# Patient Record
Sex: MALE | Race: BLACK OR AFRICAN AMERICAN | Employment: OTHER | URBAN - METROPOLITAN AREA
[De-identification: names, ages, dates, MRNs, and addresses within clinical notes are randomized per-mention and may not be internally consistent; named-entity substitution may affect disease eponyms.]

---

## 2017-03-16 ENCOUNTER — TELEPHONE (OUTPATIENT)
Dept: SLEEP MEDICINE | Age: 77
End: 2017-03-16

## 2017-03-16 ENCOUNTER — HOSPITAL ENCOUNTER (OUTPATIENT)
Dept: SLEEP MEDICINE | Age: 77
Discharge: HOME | End: 2017-03-16
Payer: MEDICARE

## 2017-03-16 DIAGNOSIS — I10 BENIGN ESSENTIAL HYPERTENSION: ICD-10-CM

## 2017-03-16 DIAGNOSIS — E66.09 EXOGENOUS OBESITY: ICD-10-CM

## 2017-03-16 DIAGNOSIS — G47.33 OSA (OBSTRUCTIVE SLEEP APNEA): ICD-10-CM

## 2017-03-16 PROCEDURE — 95811 POLYSOM 6/>YRS CPAP 4/> PARM: CPT

## 2017-03-17 ENCOUNTER — DOCUMENTATION ONLY (OUTPATIENT)
Dept: SLEEP MEDICINE | Age: 77
End: 2017-03-17

## 2017-03-17 NOTE — PROGRESS NOTES
Sleep study scored per the American Academy of Sleep Medicine (AASM) Manual for the Scoring of Sleep and Associated Events, Rules, Terminology and Technical Specifications Visual and Cardiac Rules, 2007. V2.2    Preliminary split sleep study report scanned in to patient's chart.

## 2017-03-17 NOTE — PROGRESS NOTES
· Patient arrived for sleep study. · Physician orders were reviewed. · Patient education to include initiation of PAP therapy per protocol. · Patient questions were answered. · The patient demonstrated good understanding of the positive airway pressure device.     [facility]

## 2017-03-17 NOTE — PROGRESS NOTES
 Sleep study completed per physician order.  Patient discharged from sleep center.  Patient awake, alert and able to leave the facility under their own power.  Instructed to follow up with their sleep physician for results.    [facility]

## 2017-10-31 ENCOUNTER — HOSPITAL ENCOUNTER (OUTPATIENT)
Dept: PHYSICAL THERAPY | Age: 77
Discharge: HOME | End: 2017-10-31
Payer: MEDICARE

## 2017-10-31 PROCEDURE — G8979 MOBILITY GOAL STATUS: HCPCS

## 2017-10-31 PROCEDURE — 97110 THERAPEUTIC EXERCISES: CPT

## 2017-10-31 PROCEDURE — G8978 MOBILITY CURRENT STATUS: HCPCS

## 2017-10-31 PROCEDURE — 97162 PT EVAL MOD COMPLEX 30 MIN: CPT

## 2017-10-31 PROCEDURE — 97112 NEUROMUSCULAR REEDUCATION: CPT

## 2017-10-31 NOTE — PROGRESS NOTES
PHYSICAL THERAPY - DAILY TREATMENT NOTE    Patient Name: Yolie Montoya        Date: 10/31/2017  : 1940   YES Patient  Verified  Visit #:   1     Insurance: Payor: [place] MEDICARE / Plan: VA MEDICARE PART A & B / Product Type: Medicare /      In time: 1:21 pm Out time: 2:31pm   Total Treatment Time: 70     Medicare Time Tracking (below)   Total Timed Codes (min):  33 1:1 Treatment Time:  33     TREATMENT AREA =  Vertigo [R42]  SUBJECTIVE  Pain Level (on 0 to 10 scale):  4  / 10   Medication Changes/New allergies or changes in medical history, any new surgeries or procedures? NO    If yes, update Summary List   Subjective Functional Status/Changes:  []  No changes reported     See POC         OBJECTIVE  Modalities Rationale:   N/a    8 min Neuro Re-education: Education in vestibular rehabilitation program, anatomy and physiology, VSE in sitting and purpose of vestibular adaptation exercise   Rationale:        25 min Therapeutic Exercise:  [x]  See flow sheet   Rationale:      increase ROM and increase strength to improve the patients ability to perform ADLs      min Patient Education:  YES  Reviewed HEP   []  Progressed/Changed HEP based on: Other Objective/Functional Measures:    Other Tests:Justification for Eval Complexity:   Patient History: HIGH Complexity :3+ comorbidities / personal factors will impact the outcome/ POC  (Arthritis, Hearing impairment, HTN, asthma, hx stroke)  Examination:HIGH Complexity : 4+ Standardized tests and measures addressing body structure, function, activity limitation and / or participation in recreation  (See POC and musculoskeletal examination attached,  DGI,+DYAN Cm)  Clinical Presentation: MEDIUM Complexity : Evolving with changing characteristics  (pain level 4/10 on average and 8/10 @ worst, dizziness and imbalance, constant pain)  Clinical Decision Making:MEDIUM Complexity : FOTO score of 26-74 (Foto 65/100) (DHI 60/100)  Overall Complexity:MEDIUM     Post Treatment Pain Level (on 0 to 10) scale:   4  / 10     ASSESSMENT  Assessment/Changes in Function:     See POC   []  See Progress Note/Recertification   Patient will continue to benefit from skilled PT services to modify and progress therapeutic interventions, address functional mobility deficits, address ROM deficits, address strength deficits, analyze and address soft tissue restrictions, analyze and cue movement patterns, analyze and modify body mechanics/ergonomics and assess and modify postural abnormalities to attain remaining goals.    Progress toward goals / Updated goals:    See POC     PLAN  [x]  Upgrade activities as tolerated YES Continue plan of care   []  Discharge due to :    []  Other:      Therapist: Rosemary Quintana DPT     Date: 10/31/2017 Time: 7:53 AM     Future Appointments  Date Time Provider Cyndi Mckeon   10/31/2017 1:30 PM [address]   11/16/2017 11:00 AM Dedrick Navarro MD [address]

## 2017-10-31 NOTE — PROGRESS NOTES
[facility] AT [facility] [address], [address], [address] - Phone: [phone]  Fax: [phone] / [address] [facility]  Patient Name: Stefany Walker : 1940   Medical   Diagnosis: Vertigo [R42] Treatment Diagnosis: Vertigo    Onset Date: 2017     Referral Source: Arleen Cooper MD [place] [facility]): 10/31/2017   Prior Hospitalization: See medical history Provider #: [id number]   Prior Level of Function: Imbalance without sx    Comorbidities: Arthritis, Hearing Impairment, HTN, Asthma, Hx of  Stroke (2000), Osteoporosis, Hypercholesterolemia, Sleep Apnea, Neck Surgery (2008), Prostate surgery (2017)   Medications: Verified on Patient Summary List   The Plan of Care and following information is based on the information from the initial evaluation.    ===========================================================================================  Assessment / key information:  Patient is a 69 y.o. male who presents to [facility] with diagnosis of Vertigo [R42]. Patient reports nausea, spinning, vomiting, and imbalance began with laying on the right side in bed on 2017. MRI and CT scan was clear. Reports hx of falls and notes frequent veering with ambulation. Dizziness increases with looking down, getting in and out of bed, and forward bending. Imbalance increases with walking in the dark, walking in the grocery store, and walking on uneven surfaces. Upon objective evaluation, patient demonstrates impaired use of vestibular input and ankle/hip balance strategies. Patient scored 17/24 on DGI and 60/100 on [address] indicating increased risk of fall with ambulation. R/L Lower extremity muscle strength was as follows: hip flexion 4/4+, hip ABD 3/3+, hip ADD 4/4-, hip ext 3+/3+, knee flexion 5-/5-, knee extension 4+/4+, and DF 4/4+.   Patient demonstrated (+) Hallpike alberto on the right. Patient scored 72 on FOTO indcating decreased quality of life. Patient can benefit from PT interventions to decrease r/o fall, improve safety with ADLs, & decrease sx with ADLs & to improve overall functional status.  ==================================================================================  Eval Complexity: History: HIGH Complexity :3+ comorbidities / personal factors will impact the outcome/ POC Exam:HIGH Complexity : 4+ Standardized tests and measures addressing body structure, function, activity limitation and / or participation in recreation  Presentation: MEDIUM Complexity : Evolving with changing characteristics  Clinical Decision Making:MEDIUM Complexity : FOTO score of 26-74Overall Complexity:MEDIUM  Problem List: decrease strength, impaired gait/ balance, decrease ADL/ functional abilitiies, decrease activity tolerance, decrease flexibility/ joint mobility and decrease transfer abilities  Treatment Plan may include any combination of the following: Therapeutic exercise, Therapeutic activities, Neuromuscular re-education, Physical agent/modality, Gait/balance training, Manual therapy and Patient education  Patient / Family readiness to learn indicated by: asking questions, trying to perform skills and interest  Persons(s) to be included in education: patient (P)  Barriers to Learning/Limitations: yes;  sensory deficits-vision/hearing/speech  Measures taken: Visual cuing and visual aids   Patient Goal (s): \"less dizziness\"   Patient self reported health status: fair  Rehabilitation Potential: good   Short Term Goals: To be accomplished in  4  Weeks:  1) Patient performing daily HEP and educated in fall prevention techniques. 2) Patient will be able to maintain MSR heel to GT for 30 seconds eyes open to increase safety with standing in narrow spaces. 3) Patient will be able to maintain MSR heel to arch for 30 seconds eyes closed to increase safety with showering.      4) Patient to improve score on DGI to 19/24 indicating decreased fall risk with community ambulation.  Long Term Goals: To be accomplished in  8  Weeks:  1)  Patient to be independent  with HEP. 2) Patient to improve score on DGI to 21/24 indicating decreased fall risk with ambulation. 3) Patient to improve score on FOTO to 78 indicating improved quality of life. 4)  Patient to improve score on DHI to 45/100 indicating decreased fall risk with ambulation. 5) Patient will be able to maintain MSR heel to bunion for 30 seconds eyes closed to increase safety with showering. Frequency / Duration:   Patient to be seen  1-2  times per week for 6-8  weeks:  Patient / Caregiver education and instruction: self care, activity modification and exercises  G-Codes (GP): Mobility [id number] Current  CK= 40-59%   Goal  CJ= 20-39%. The severity rating is based on the Other DGI      Therapist Signature: Harshil Gustafson Date: 36/26/4953   Certification Period: 10/31/2017 to 1/30/2017 Time: 7:55 AM   ===========================================================================================  I certify that the above Physical Therapy Services are being furnished while the patient is under my care. I agree with the treatment plan and certify that this therapy is necessary. Physician Signature:        Date:       Time:     Please sign and return to [facility] or you may fax the signed copy to [phone]. Thank you.

## 2017-11-06 ENCOUNTER — HOSPITAL ENCOUNTER (OUTPATIENT)
Dept: PHYSICAL THERAPY | Age: 77
Discharge: HOME | End: 2017-11-06
Payer: MEDICARE

## 2017-11-06 PROCEDURE — 97112 NEUROMUSCULAR REEDUCATION: CPT

## 2017-11-06 PROCEDURE — 97116 GAIT TRAINING THERAPY: CPT

## 2017-11-06 NOTE — PROGRESS NOTES
PHYSICAL THERAPY - DAILY TREATMENT NOTE    Patient Name: Trevor Monzon        Date: 2017  : 1940   YES Patient  Verified  Visit #:   2     Insurance: Payor: VA MEDICARE / Plan: VA MEDICARE PART A & B / Product Type: Medicare /      In time: 11:41am Out time: 12:10am   Total Treatment Time: 29     Medicare Time Tracking (below)   Total Timed Codes (min):  29 1:1 Treatment Time:  29     TREATMENT AREA =  Vertigo [R42]    SUBJECTIVE  Pain Level (on 0 to 10 scale):  0  / 10   Medication Changes/New allergies or changes in medical history, any new surgeries or procedures? NO    If yes, update Summary List   Subjective Functional Status/Changes:  []  No changes reported     Patient States \"I have an appointment Wednesday, I did get a little dizzy, but the dizziness wasn't as frequent as Friday, I moved too fast just stepping out of the chair going to the closet and almost tripped over wife's chair\"         OBJECTIVE  Modalities Rationale:  n/a    19 min Neuromuscular Re-ed: Static and dynamic balance as per flow sheet   Rationale:    improve balance to improve the patients ability to ambulate on uneven terrain and in narrow corridors. 10 min Gait Training: See flow Sheet   Rationale:         min Patient Education:  YES  Reviewed HEP   []  Progressed/Changed HEP based on: Other Objective/Functional Measures: Added MRS heel to bun EO, heel to arch EC     Post Treatment Pain Level (on 0 to 10) scale:   0  / 10     ASSESSMENT  Assessment/Changes in Function:     Smith pike alberto and maneuver deferred to next visit secondary to vestibular testing with MD Wednesday. Increased difficulty and sway with balance with EC indicating vestibular dysfunction.  Increased veer right with ambulation with EC.      []  See Progress Note/Recertification   Patient will continue to benefit from skilled PT services to modify and progress therapeutic interventions, address functional mobility deficits, address ROM deficits, address strength deficits, analyze and address soft tissue restrictions, analyze and cue movement patterns, analyze and modify body mechanics/ergonomics, assess and modify postural abnormalities, address imbalance/dizziness and instruct in home and community integration to attain remaining goals. Progress toward goals / Updated goals:  First visit after initial evaluation. Progress tx per POC.         PLAN  [x]  Upgrade activities as tolerated YES Continue plan of care   []  Discharge due to :    []  Other:      Therapist: Kortney Nguyen    Date: 11/6/2017 Time: 8:30 AM     Future Appointments  Date Time Provider Cyndi Mckeon   11/6/2017 12:00 PM [place] [address]   11/15/2017 12:00 PM [place] [address]   11/16/2017 11:00 AM Kamila Redman MD [address] [facility]   11/21/2017 1:30 PM [employer] [address] [place] [address] [address]   11/28/2017 1:30 PM Kortney Nguyen [place] [address]   12/4/2017 2:30 PM Kortney MATSON [facility] [address]

## 2017-11-15 ENCOUNTER — HOSPITAL ENCOUNTER (OUTPATIENT)
Dept: PHYSICAL THERAPY | Age: 77
Discharge: HOME | End: 2017-11-15
Payer: MEDICARE

## 2017-11-15 PROCEDURE — 97112 NEUROMUSCULAR REEDUCATION: CPT

## 2017-11-15 PROCEDURE — 97140 MANUAL THERAPY 1/> REGIONS: CPT

## 2017-11-15 NOTE — PROGRESS NOTES
PHYSICAL THERAPY - DAILY TREATMENT NOTE    Patient Name: Camila Vargas        Date: 11/15/2017  : 1940   YES Patient  Verified  Visit #:   3     Insurance: Payor: VA MEDICARE / Plan: VA MEDICARE PART A & B / Product Type: Medicare /      In time: 12:01pm Out time: 12:26   Total Treatment Time: 25     Medicare Time Tracking (below)   Total Timed Codes (min):  25 1:1 Treatment Time:  25     TREATMENT AREA =  Vertigo [R42]    SUBJECTIVE  Pain Level (on 0 to 10 scale):  0  / 10   Medication Changes/New allergies or changes in medical history, any new surgeries or procedures? NO    If yes, update Summary List   Subjective Functional Status/Changes:  []  No changes reported     Patient States \"been moving real slow for the past 2 days. With the testing the only dizziness I got was standing up walking, if I lay down I dont get dizzy, Ihave to be careful when I'm rolling to the right. Linda tried to roll on the right no spinning or anything\"       OBJECTIVE  Modalities Rationale:  n/a    15 min Neuromuscular Re-ed: Post maneuver instructions   Rationale:    improve balance to improve the patients ability to ambulate on uneven terrain and in narrow corridors. 10 min Manual Therapy: Kaylee Marr, maneuver for R    Rationale:         min Patient Education:  YES  Reviewed HEP   []  Progressed/Changed HEP based on: Other Objective/Functional Measures:    R Hallpike Alberto (+)/ L Hallpike (-)   Post Treatment Pain Level (on 0 to 10) scale:   0  / 10     ASSESSMENT  Assessment/Changes in Function:     Demonstrated nystagmus with R hallpike alberto. Educated patient in post maneuver instruction, issued instructions with good patient understanding. Reassess Hallpike next visit.       []  See Progress Note/Recertification   Patient will continue to benefit from skilled PT services to modify and progress therapeutic interventions, address functional mobility deficits, address ROM deficits, address strength deficits, analyze and address soft tissue restrictions, analyze and cue movement patterns, analyze and modify body mechanics/ergonomics, assess and modify postural abnormalities, address imbalance/dizziness and instruct in home and community integration to attain remaining goals. Progress toward goals / Updated goals:    Progressing towards STG 1.   1) Patient performing daily HEP and educated in fall prevention techniques.  Goal Met Linda been doing the balance exercises 2x per day       PLAN  [x]  Upgrade activities as tolerated YES Continue plan of care   []  Discharge due to :    []  Other:      Therapist: Ioana Maria    Date: 11/15/2017 Time: 12:02 PM     Future Appointments  Date Time Provider Cyndi Mckeon   11/21/2017 1:30 PM [address]   11/29/2017 12:00 PM [place]   12/4/2017 2:30 PM [place] [address]   12/12/2017 1:45 PM Yunier De La Cruz MD [address] [facility]

## 2017-11-21 ENCOUNTER — HOSPITAL ENCOUNTER (OUTPATIENT)
Dept: PHYSICAL THERAPY | Age: 77
Discharge: HOME | End: 2017-11-21
Payer: MEDICARE

## 2017-11-21 PROCEDURE — 97140 MANUAL THERAPY 1/> REGIONS: CPT

## 2017-11-21 NOTE — PROGRESS NOTES
PHYSICAL THERAPY - DAILY TREATMENT NOTE    Patient Name: Amor Pemberton        Date: 2017  : 1940   YES Patient  Verified  Visit #:   4   of   -  Insurance: Payor: VA MEDICARE / Plan: VA MEDICARE PART A & B / Product Type: Medicare /      In time: 1:30pm Out time: 1:45pm   Total Treatment Time: 15     Medicare Time Tracking (below)   Total Timed Codes (min):  15 1:1 Treatment Time:  15     TREATMENT AREA =  Vertigo [R42]    SUBJECTIVE  Pain Level (on 0 to 10 scale):  0  / 10   Medication Changes/New allergies or changes in medical history, any new surgeries or procedures? NO    If yes, update Summary List   Subjective Functional Status/Changes:  []  No changes reported     Patient States \"the first few days were good and then I started to get dizzy more of a smooth dizzy ness\"         OBJECTIVE  Modalities Rationale:  n/a    15 min Manual Therapy: Demax Sear, maneuver for R    Rationale:         min Patient Education:  YES  Reviewed HEP   []  Progressed/Changed HEP based on: Other Objective/Functional Measures:    R Hallpike Mackenzie (+)/ L Hallpike (-)     Post Treatment Pain Level (on 0 to 10) scale:   0  / 10     ASSESSMENT  Assessment/Changes in Function:     Demonstrated nystagmus with R hallpike mackenzie. Increased dizziness and sx with laying on left side and looking down. Educated patient in post maneuver instruction, issued instructions with good patient understanding. Reassess Hallpike next visit.       []  See Progress Note/Recertification   Patient will continue to benefit from skilled PT services to modify and progress therapeutic interventions, address functional mobility deficits, address ROM deficits, address strength deficits, analyze and address soft tissue restrictions, analyze and cue movement patterns, analyze and modify body mechanics/ergonomics, assess and modify postural abnormalities, address imbalance/dizziness and instruct in home and community integration to attain remaining goals. Progress toward goals / Updated goals:    Progressing towards STG 1.   1) Patient performing daily HEP and educated in fall prevention techniques.       PLAN  [x]  Upgrade activities as tolerated YES Continue plan of care   []  Discharge due to :    []  Other:      Therapist: Vera Coughlin    Date: 11/21/2017 Time: 8:23 AM     Future Appointments  Date Time Provider Cyndi Mckeon   11/21/2017 1:30 PM [address]   11/29/2017 12:00 PM [address]   12/4/2017 2:30 PM [address]   12/12/2017 1:45 PM Alejandro Kim MD [address]

## 2017-11-28 ENCOUNTER — APPOINTMENT (OUTPATIENT)
Dept: PHYSICAL THERAPY | Age: 77
End: 2017-11-28
Payer: MEDICARE

## 2017-11-29 ENCOUNTER — HOSPITAL ENCOUNTER (OUTPATIENT)
Dept: PHYSICAL THERAPY | Age: 77
Discharge: HOME | End: 2017-11-29
Payer: MEDICARE

## 2017-11-29 PROCEDURE — 97116 GAIT TRAINING THERAPY: CPT

## 2017-11-29 PROCEDURE — 97112 NEUROMUSCULAR REEDUCATION: CPT

## 2017-11-29 NOTE — PROGRESS NOTES
PHYSICAL THERAPY - DAILY TREATMENT NOTE    Patient Name: Sydney Hernandez        Date: 2017  : 1940   YES Patient  Verified  Visit #:      of   -  Insurance: Payor: VA MEDICARE / Plan: VA MEDICARE PART A & B / Product Type: Medicare /      In time: 12:03pm Out time: 12:40pm   Total Treatment Time: 37     Medicare Time Tracking (below)   Total Timed Codes (min):  37 1:1 Treatment Time:  37     TREATMENT AREA =  Vertigo [R42]    SUBJECTIVE  Pain Level (on 0 to 10 scale):  0  / 10   Medication Changes/New allergies or changes in medical history, any new surgeries or procedures? NO    If yes, update Summary List   Subjective Functional Status/Changes:  []  No changes reported     Patient States \"i feel 95% better I can roll on my right side without dizziness\"         OBJECTIVE  Modalities Rationale:  n/a    29 min Neuromuscular Re-ed: Static and dynamic balance as per flow sheet   Rationale:    improve balance to improve the patients ability to ambulate on uneven terrain and in narrow corridors. 8 min Gait Training: See flow Sheet, DGI    Rationale:         min Patient Education:  Brandi Del Cid   []  Progressed/Changed HEP based on: Other Objective/Functional Measures:    See PN     Post Treatment Pain Level (on 0 to 10) scale:   0  / 10     ASSESSMENT  Assessment/Changes in Function:     See PC     []  See Progress Note/Recertification   Patient will continue to benefit from skilled PT services to modify and progress therapeutic interventions, address functional mobility deficits, address ROM deficits, address strength deficits, analyze and address soft tissue restrictions, analyze and cue movement patterns, analyze and modify body mechanics/ergonomics, assess and modify postural abnormalities, address imbalance/dizziness and instruct in home and community integration to attain remaining goals.    Progress toward goals / Updated goals:    See PN     PLAN  [x]  Upgrade activities as tolerated YES Continue plan of care   []  Discharge due to :    []  Other:      Therapist: Tian Griffin    Date: 11/29/2017 Time: 8:25 AM     Future Appointments  Date Time Provider Cyndi Mckeon   11/29/2017 12:00 PM [address]   12/4/2017 2:30 PM [address]   12/12/2017 1:45 PM Jeet Zimmer MD [address] [facility]

## 2017-12-04 ENCOUNTER — HOSPITAL ENCOUNTER (OUTPATIENT)
Dept: PHYSICAL THERAPY | Age: 77
Discharge: HOME | End: 2017-12-04
Payer: MEDICARE

## 2017-12-04 PROCEDURE — 97116 GAIT TRAINING THERAPY: CPT

## 2017-12-04 PROCEDURE — 97112 NEUROMUSCULAR REEDUCATION: CPT

## 2017-12-04 NOTE — PROGRESS NOTES
PHYSICAL THERAPY - DAILY TREATMENT NOTE    Patient Name: Nafisa Aguirre        Date: 2017  : 1940   YES Patient  Verified  Visit #:   (6) 1   of   3  Insurance: Payor: VA MEDICARE / Plan: VA MEDICARE PART A & B / Product Type: Medicare /      In time: 2:35 pm Out time: 3:07pm   Total Treatment Time: 32     Medicare Time Tracking (below)   Total Timed Codes (min):  32 1:1 Treatment Time:  32     TREATMENT AREA =  Vertigo [R42]    SUBJECTIVE  Pain Level (on 0 to 10 scale):  0  / 10   Medication Changes/New allergies or changes in medical history, any new surgeries or procedures? NO    If yes, update Summary List   Subjective Functional Status/Changes:  []  No changes reported     Patient States \"no sx rolling in bed to the right, on the  I have an appt with the [facility]\"         OBJECTIVE  Modalities Rationale:  n/a    20 min Neuromuscular Re-ed: Static and dynamic balance as per flow sheet   Rationale:    improve balance to improve the patients ability to ambulate on uneven terrain and in narrow corridors. 12 min Gait Training: See flow Sheet   Rationale:         min Patient Education:  YES  Reviewed HEP   []  Progressed/Changed HEP based on: Other Objective/Functional Measures: Added VVI with good tolerance. Post Treatment Pain Level (on 0 to 10) scale:   0  / 10     ASSESSMENT  Assessment/Changes in Function:     Progressed vestibular adaptation exercises as appropriate, VSE to standing, VVI in sitting. Progressed heel to arch EC to heel to bunion EC with increased difficulty. Increased sway with EC on foam rhomberg. Updated visual adaptation and balance training.       []  See Progress Note/Recertification   Patient will continue to benefit from skilled PT services to modify and progress therapeutic interventions, address functional mobility deficits, address ROM deficits, address strength deficits, analyze and address soft tissue restrictions, analyze and cue movement patterns, analyze and modify body mechanics/ergonomics, assess and modify postural abnormalities, address imbalance/dizziness and instruct in home and community integration to attain remaining goals. Progress toward goals / Updated goals:    Progressing towards all LTGs.       PLAN  [x]  Upgrade activities as tolerated YES Continue plan of care   []  Discharge due to :    []  Other:      Therapist: Sena Garcia    Date: 12/4/2017 Time: 11:33 AM     Future Appointments  Date Time Provider Cyndi Mckeon   12/11/2017 11:30 AM [address]   12/12/2017 1:45 PM Juanito Roach MD [facility]   12/21/2017 3:30 PM [address]

## 2017-12-11 ENCOUNTER — HOSPITAL ENCOUNTER (OUTPATIENT)
Dept: PHYSICAL THERAPY | Age: 77
Discharge: HOME | End: 2017-12-11
Payer: MEDICARE

## 2017-12-11 PROCEDURE — 97112 NEUROMUSCULAR REEDUCATION: CPT

## 2017-12-11 PROCEDURE — 97116 GAIT TRAINING THERAPY: CPT

## 2017-12-11 NOTE — PROGRESS NOTES
PHYSICAL THERAPY - DAILY TREATMENT NOTE    Patient Name: Nura Mosley        Date: 2017  : 1940   YES Patient  Verified  Visit #:   (7) 2   of   3  Insurance: Payor: [place] / Plan: [place] MEDICARE PART A & B / Product Type: Medicare /      In time: 11:30am Out time: 12:01   Total Treatment Time: 31     Medicare Time Tracking (below)   Total Timed Codes (min):  31 1:1 Treatment Time:  31     TREATMENT AREA =  Vertigo [R42]    SUBJECTIVE  Pain Level (on 0 to 10 scale):  0  / 10   Medication Changes/New allergies or changes in medical history, any new surgeries or procedures? NO    If yes, update Summary List   Subjective Functional Status/Changes:  []  No changes reported     Patient States \"balance is going as usual before I had the vertigo, I do get a little light dizziness. If I get in bed too fast and roll to the right I get a little dizziness\"       OBJECTIVE  Modalities Rationale:  n/a    20 min Neuromuscular Re-ed: Static and dynamic balance as per flow sheet   Rationale:    improve balance to improve the patients ability to ambulate on uneven terrain and in narrow corridors. 11 min Gait Training: See flow Sheet   Rationale:         min Patient Education:  YES  Reviewed HEP   []  Progressed/Changed HEP based on: Other Objective/Functional Measures:    Slight veering with HHT and VHT during ambulation. Slight veer L and decreased rock with bkw amb. Progressed VSE and VVI to standing with background feet apart     Post Treatment Pain Level (on 0 to 10) scale:   0  / 10     ASSESSMENT  Assessment/Changes in Function:     Patient has progressed well with PT services consistently reporting improved sx and functional mobility, thus patient is appropriate for D/C to home mgt of sx next visit. Discussed discharge planning, with patient reported agreeance.       []  See Progress Note/Recertification   Patient will continue to benefit from skilled PT services to modify and progress therapeutic interventions, address functional mobility deficits, address ROM deficits, address strength deficits, analyze and address soft tissue restrictions, analyze and cue movement patterns, analyze and modify body mechanics/ergonomics, assess and modify postural abnormalities, address imbalance/dizziness and instruct in home and community integration to attain remaining goals. Progress toward goals / Updated goals:    Progressing towards all LTGs.       PLAN  [x]  Upgrade activities as tolerated YES Continue plan of care   []  Discharge due to :    []  Other:      Therapist: Richard Jones    Date: 12/11/2017 Time: 11:31 AM     Future Appointments  Date Time Provider Cyndi Mckeon   12/12/2017 1:45 PM Kerri Braswell MD [id number] [facility]   12/21/2017 3:30 PM [address]

## 2017-12-21 ENCOUNTER — HOSPITAL ENCOUNTER (OUTPATIENT)
Dept: PHYSICAL THERAPY | Age: 77
Discharge: HOME | End: 2017-12-21
Payer: MEDICARE

## 2017-12-21 PROCEDURE — G8979 MOBILITY GOAL STATUS: HCPCS

## 2017-12-21 PROCEDURE — 97116 GAIT TRAINING THERAPY: CPT

## 2017-12-21 PROCEDURE — 97112 NEUROMUSCULAR REEDUCATION: CPT

## 2017-12-21 PROCEDURE — G8980 MOBILITY D/C STATUS: HCPCS

## 2017-12-21 NOTE — PROGRESS NOTES
PHYSICAL THERAPY - DAILY TREATMENT NOTE    Patient Name: Oscar Oconnell        Date: 2017  : 1940   YES Patient  Verified  Visit #:   (8) 3   of   3  Insurance: Payor: [place] MED[place]ARE / Plan: [place] MEDICARE PART A & B / Product Type: Medicare /      In time: 3:34 pm Out time: 4:14pm   Total Treatment Time: 40     Medicare Time Tracking (below)   Total Timed Codes (min):  40 1:1 Treatment Time:  40     TREATMENT AREA =  Vertigo [R42]    SUBJECTIVE  Pain Level (on 0 to 10 scale):  0  / 10   Medication Changes/New allergies or changes in medical history, any new surgeries or procedures? NO    If yes, update Summary List   Subjective Functional Status/Changes:  []  No changes reported     Pt states \"balance is good and still get dizziness, had an ENT appt, and she put me back in the chair 3-4 times, went in a room machine\"         OBJECTIVE  Modalities Rationale:   N/A     30 min Neuromuscular Re-ed: Static and dynamic balance as per flow sheet   Rationale:    improve balance to improve the patients ability to ambulate on uneven terrain and in narrow corridors.      10 min Gait Training: See flow Sheet   Rationale:         min Patient Education:  YES  Reviewed HEP   []  Progressed/Changed HEP based on: Other Objective/Functional Measures:    FOTO =70/100     Post Treatment Pain Level (on 0 to 10) scale:   0  / 10     ASSESSMENT  Assessment/Changes in Function:     See DC.     []  See Progress Note/Recertification   Patient will continue to benefit from skilled PT services to modify and progress therapeutic interventions, address functional mobility deficits, address ROM deficits, address strength deficits, analyze and address soft tissue restrictions, analyze and cue movement patterns, analyze and modify body mechanics/ergonomics, assess and modify postural abnormalities, address imbalance/dizziness and instruct in home and community integration to attain remaining goals.    Progress toward goals / Updated goals:    See DC     PLAN  []  Upgrade activities as tolerated YES Continue plan of care   [x]  Discharge due to : Met or progressing towards all goals.    []  Other:      Therapist: Dede Mathias    Date: 12/21/2017 Time: 11:42 AM     Future Appointments  Date Time Provider Cyndi Mckeon   12/21/2017 3:30 PM [address]   6/15/2018 2:00 PM Elan Jackson MD [address] [facility]

## 2017-12-21 NOTE — PROGRESS NOTES
[facility] PHYSICAL THERAPY  [address] [facility] [address] - Phone: [phone]  Fax: [phone] SUMMARY FOR PHYSICAL THERAPY          Patient Name: Oscar Oconnell : 1940   Treatment/Medical Diagnosis: Vertigo [R42]   Onset Date: 2017    Referral Source: Indiana Melendez MD [place] of Care [facility]): 10/31/2017   Prior Hospitalization: See Medical History Provider #: [id number]   Prior Level of Function: Imbalance without sx    Comorbidities: Arthritis, Hearing Impairment, HTN, Asthma, Hx of  Stroke (2000), Osteoporosis, Hypercholesterolemia, Sleep Apnea, Neck Surgery (2008), Prostate surgery (2017)   Medications: Verified on Patient Summary List   Visits from [facility]: 8 Missed Visits: 0     Goal/Measure of Progress Goal Met? 1. Patient to improve score on DGI to 19/24 indicating decreased fall risk with community ambulation. Status at last Eval: DGI = 17/24 Current Status: DGI = 18/24 Progressing   2. Patient to improve score on FOTO to 78 indicating improved quality of life. Status at last Eval: FOTO = 65/100 Current Status: FOTO = 70/100 Progressing    3. Patient will be able to maintain MSR heel to bunion for 30 seconds eyes closed to increase safety with    Status at last Eval: MSR heel to arch EC = 30 sec Current Status: L/R MSR heel to Bunion EC = 29/30 sec yes   4. Patient to improve score on DHI to 45/100 indicating decreased fall risk with ambulation.     Status at last Eval: [address] [place] [address] = 60/100 Current Status: [address] [place] [address] = 38/100 yes     Key Functional Changes/Progress: The pt has progressed well with therapy, consistently reporting decreased pain and increased functional ability. Pt reports 80% improvement in balance and dizziness sx since initiating PT services.  Based on progress from PT services and pt reported improvement, patient is appropriate for DC to home mgt of sx at this time    G-Codes (GP): Mobility   Goal  CJ= 20-39%  D/C  CJ= 20-39%. The severity rating is based on the Other DGI    Assessments/Recommendations: Discontinue therapy. Progressing towards or have reached established goals. If you have any questions/comments please contact us directly at  [phone]. Thank you for allowing us to assist in the care of your patient.     Therapist Signature: Shannan Gray Date: 12/21/2017   Reporting Period: 10/31/2017 to 12/21/2017 Time: 11:44 AM

## 2018-09-25 PROBLEM — H81.10 BENIGN PAROXYSMAL POSITIONAL VERTIGO: Status: ACTIVE | Noted: 2017-09-29

## 2018-09-25 PROBLEM — N40.0 PROSTATE HYPERTROPHY: Status: ACTIVE | Noted: 2017-05-16

## 2018-09-25 PROBLEM — R42 VERTIGO: Status: ACTIVE | Noted: 2017-09-29

## 2018-09-25 PROBLEM — N40.0 BPH (BENIGN PROSTATIC HYPERPLASIA): Status: ACTIVE | Noted: 2017-05-16

## 2018-09-25 PROBLEM — R07.9 CHEST PAIN: Status: ACTIVE | Noted: 2017-09-29

## 2022-03-11 PROBLEM — F43.12 CHRONIC POST-TRAUMATIC STRESS DISORDER: Status: ACTIVE | Noted: 2022-03-11

## 2022-03-11 PROBLEM — E78.5 HYPERLIPIDEMIA: Status: ACTIVE | Noted: 2022-03-11

## 2022-03-11 PROBLEM — E66.3 OVERWEIGHT: Status: ACTIVE | Noted: 2022-03-11

## 2022-03-11 PROBLEM — M77.30 CALCANEAL SPUR: Status: ACTIVE | Noted: 2022-03-11

## 2022-03-11 PROBLEM — I10 BENIGN ESSENTIAL HYPERTENSION: Status: ACTIVE | Noted: 2022-03-11

## 2022-03-11 PROBLEM — H52.4 PRESBYOPIA: Status: ACTIVE | Noted: 2022-03-11

## 2022-03-11 PROBLEM — K21.9 GASTROESOPHAGEAL REFLUX DISEASE: Status: ACTIVE | Noted: 2022-03-11

## 2022-03-11 PROBLEM — M25.519 SHOULDER PAIN: Status: ACTIVE | Noted: 2022-03-11

## 2022-03-11 PROBLEM — H91.93 BILATERAL HEARING LOSS: Status: ACTIVE | Noted: 2022-03-11

## 2022-03-11 PROBLEM — N42.9 DISORDER OF PROSTATE: Status: ACTIVE | Noted: 2022-03-11

## 2022-03-11 PROBLEM — I63.9 CEREBROVASCULAR ACCIDENT (HCC): Status: ACTIVE | Noted: 2022-03-11

## 2022-03-11 PROBLEM — R32 URINARY INCONTINENCE: Status: ACTIVE | Noted: 2022-03-11

## 2022-03-11 PROBLEM — M54.50 LOW BACK PAIN: Status: ACTIVE | Noted: 2022-03-11

## 2022-03-11 PROBLEM — M54.2 NECK PAIN: Status: ACTIVE | Noted: 2022-03-11

## 2022-03-11 PROBLEM — H52.209 ASTIGMATISM: Status: ACTIVE | Noted: 2022-03-11

## 2022-03-11 PROBLEM — M10.9 GOUT: Status: ACTIVE | Noted: 2022-03-11

## 2022-03-11 PROBLEM — S82.899A CLOSED FRACTURE OF ANKLE: Status: ACTIVE | Noted: 2022-03-11

## 2022-03-11 PROBLEM — I63.30 CEREBRAL INFARCTION DUE TO THROMBOSIS OF CEREBRAL ARTERY (HCC): Status: ACTIVE | Noted: 2022-03-11

## 2022-03-11 PROBLEM — G47.00 INSOMNIA: Status: ACTIVE | Noted: 2022-03-11

## 2023-09-07 NOTE — PROGRESS NOTES
[address] THERAPY  [address] -   Phone: [phone]  Fax: [phone]  [x]  PROGRESS NOTE  []  DISCHARGE SUMMARY  Patient Name: Myriam Salinas : 1940   Treatment Diagnosis: Vertigo [R42]     Referral Source: Kurt Alejandre MD     Date of Initial Visit: 10/31/2017 Attended Visits: 4 Missed Visits: 0     SUMMARY OF TREATMENT  Therapeutic exercises including VSE/VVI, gait training, balance training, postural re-education, Hallpilke alberto maneuver, HEP instruction. CURRENT STATUS  The pt has progressed well with therapy, consistently reporting decreased pain and increased functional ability. Currently, pt reports  95% improvement in dizziness sx since IE. Denies dizziness and spinning with rolling onto R side. Pt reports improved ability to roll onto right side in bed since starting PT services. Pt would benefit from continued PT services in order to improve  Balance, dizziness, gait, functional mobility, and address remaining impairments. Goal/Measure of Progress Goal Met? 1.  Establish HEP to prevent further disability. Status at last Eval: Established Current Status: I with HEP yes   2. Patient to improve score on DGI to 19/24 indicating decreased fall risk with community ambulation. Status at last Eval: DGI = 17/24 Current Status: 1824 Progressing   3. Patient will be able to maintain MSR heel to arch for 30 seconds eyes closed to increase safety with showering. Status at last Eval: Goal Established Current Status: MSR heel to arch EC = 30 sec yes   4. Patient will be able to maintain MSR heel to GT for 30 seconds eyes open to increase safety with standing in narrow spaces. Status at last Eval: Goal Established Current Status: Heel to GT EO = 30 yes     New Goals to be achieved in __3-4__  Weeks:  1.   Patient to be independent & compliant with progressive HEP in preparation for D/C.   2.  Patient to improve score on DGI to 21/24 indicating decreased fall risk with ambulation. 3.  Patient to improve score on FOTO to 78 indicating improved quality of life. 4.  Patient will be able to maintain MSR heel to bunion for 30 seconds eyes closed to increase safety with    5. Patient to improve score on DHI to 45/100 indicating decreased fall risk with ambulation. G-Codes (GP): Mobility [id number] Current  CJ= 20-39%  [id number] Goal  CJ= 20-39%. The severity rating is based on the Other DGI    RECOMMENDATIONS  Continue therapy with the following recommendations: 1x per week for 1-3 weeks    If you have any questions/comments please contact us directly at [phone]   Thank you for allowing us to assist in the care of your patient. Therapist Signature: Rosemary Quintana DPT Date: 11/29/2017     Time: 12:05 PM   NOTE TO PHYSICIAN:  PLEASE COMPLETE THE ORDERS BELOW AND FAX TO   [facility]: [phone]  If you are unable to process this request in 24 hours please contact our office: [phone]    ___ I have read the above report and request that my patient continue as recommended.   ___ I have read the above report and request that my patient continue therapy with the following changes/special instructions:_________________________________________________________   ___ I have read the above report and request that my patient be discharged from therapy.      Physician Signature:        Date:       Time: Detail Level: Detailed